# Patient Record
Sex: FEMALE | URBAN - METROPOLITAN AREA
[De-identification: names, ages, dates, MRNs, and addresses within clinical notes are randomized per-mention and may not be internally consistent; named-entity substitution may affect disease eponyms.]

---

## 2024-03-05 ENCOUNTER — OFFICE VISIT (OUTPATIENT)
Dept: URGENT CARE | Facility: CLINIC | Age: 44
End: 2024-03-05
Payer: COMMERCIAL

## 2024-03-05 VITALS
SYSTOLIC BLOOD PRESSURE: 117 MMHG | DIASTOLIC BLOOD PRESSURE: 80 MMHG | RESPIRATION RATE: 16 BRPM | OXYGEN SATURATION: 96 % | WEIGHT: 200 LBS | HEIGHT: 62 IN | BODY MASS INDEX: 36.8 KG/M2 | HEART RATE: 90 BPM | TEMPERATURE: 96.9 F

## 2024-03-05 DIAGNOSIS — B00.1 COLD SORE: ICD-10-CM

## 2024-03-05 DIAGNOSIS — F17.200 SMOKER: ICD-10-CM

## 2024-03-05 DIAGNOSIS — J40 SINOBRONCHITIS: Primary | ICD-10-CM

## 2024-03-05 DIAGNOSIS — J32.9 SINOBRONCHITIS: Primary | ICD-10-CM

## 2024-03-05 PROCEDURE — 99204 OFFICE O/P NEW MOD 45 MIN: CPT | Performed by: PHYSICIAN ASSISTANT

## 2024-03-05 RX ORDER — DOXYCYCLINE 100 MG/1
100 CAPSULE ORAL 2 TIMES DAILY
Qty: 20 CAPSULE | Refills: 0 | Status: SHIPPED | OUTPATIENT
Start: 2024-03-05 | End: 2024-03-15

## 2024-03-05 RX ORDER — VALACYCLOVIR HYDROCHLORIDE 1 G/1
1000 TABLET, FILM COATED ORAL DAILY
Qty: 5 TABLET | Refills: 5 | Status: SHIPPED | OUTPATIENT
Start: 2024-03-05 | End: 2024-03-10

## 2024-03-05 RX ORDER — ALBUTEROL SULFATE 90 UG/1
2 AEROSOL, METERED RESPIRATORY (INHALATION) EVERY 6 HOURS PRN
Qty: 18 G | Refills: 0 | Status: SHIPPED | OUTPATIENT
Start: 2024-03-05 | End: 2025-03-05

## 2024-03-05 ASSESSMENT — PAIN SCALES - GENERAL: PAINLEVEL: 4

## 2024-03-05 NOTE — PROGRESS NOTES
"Subjective   Patient ID: Neema Cueva is a 43 y.o. female who presents for Sinus Problem (Cough, green phlegm, clammy/sweats, cold sore x3 days).  Patient is a daily smoker.  Has a history of sinus infections.  Notes that her symptoms are generally situated in her chest at this time though she continues to call it a sinus infection throughout exam.  She denies any nausea vomiting or diarrhea.  She does note some wheezing but no significant shortness of breath.  She does note that the cough is painful into her ribs and back.  She is satting well on room air.    No past medical history on file.      The remainder of the systems were reviewed and are negative unless noted above      Objective   /80   Pulse 90   Temp 36.1 °C (96.9 °F)   Resp 16   Ht 1.575 m (5' 2\")   Wt 90.7 kg (200 lb)   SpO2 96%   BMI 36.58 kg/m²   Physical Exam  Vitals reviewed.   Constitutional:       General: She is not in acute distress.     Appearance: Normal appearance. She is not ill-appearing, toxic-appearing or diaphoretic.   HENT:      Head: Normocephalic and atraumatic.      Right Ear: Tympanic membrane and ear canal normal. No tenderness. No mastoid tenderness.      Left Ear: Tympanic membrane and ear canal normal. No tenderness. No mastoid tenderness.      Nose: Congestion and rhinorrhea present.      Mouth/Throat:      Mouth: Mucous membranes are moist.      Pharynx: Oropharynx is clear. Posterior oropharyngeal erythema present.   Eyes:      Conjunctiva/sclera: Conjunctivae normal.      Pupils: Pupils are equal, round, and reactive to light.   Cardiovascular:      Rate and Rhythm: Normal rate and regular rhythm.      Heart sounds: No murmur heard.  Pulmonary:      Effort: Pulmonary effort is normal. No respiratory distress.      Breath sounds: No stridor. Wheezing present. No rhonchi or rales.   Chest:      Chest wall: No tenderness.   Abdominal:      Tenderness: There is no abdominal tenderness. There is no guarding. "   Musculoskeletal:         General: No swelling, tenderness, deformity or signs of injury. Normal range of motion.      Cervical back: Normal range of motion and neck supple.   Skin:     General: Skin is warm and dry.      Capillary Refill: Capillary refill takes less than 2 seconds.      Findings: No erythema or rash.   Neurological:      General: No focal deficit present.      Mental Status: She is alert and oriented to person, place, and time.      Gait: Gait normal.         Assessment/Plan   Problem List Items Addressed This Visit       Sinobronchitis - Primary    Relevant Medications    doxycycline (Vibramycin) 100 mg capsule    albuterol 90 mcg/actuation inhaler    Cold sore    Relevant Medications    valACYclovir (Valtrex) 1 gram tablet    Smoker      -Given the patient's increased risk for atypical respiratory tract infections given her smoking status I have elected to start the patient on doxycycline and also sent a butyryl inhaler to the patient's pharmacy for the wheezing.  Patient also has a secondary complaint of cold sore at time of exam she does typically get Valtrex through her primary care doctor but has been unable to reach them over the course the last 2 days.  I have sent Valtrex to the patient's pharmacy and provided her refills

## 2024-03-05 NOTE — PATIENT INSTRUCTIONS
Assessment/Plan   Problem List Items Addressed This Visit       Sinobronchitis - Primary    Relevant Medications    doxycycline (Vibramycin) 100 mg capsule    albuterol 90 mcg/actuation inhaler    Cold sore    Relevant Medications    valACYclovir (Valtrex) 1 gram tablet    Smoker